# Patient Record
Sex: FEMALE
[De-identification: names, ages, dates, MRNs, and addresses within clinical notes are randomized per-mention and may not be internally consistent; named-entity substitution may affect disease eponyms.]

---

## 2019-03-26 ENCOUNTER — APPOINTMENT (OUTPATIENT)
Dept: OTOLARYNGOLOGY | Facility: CLINIC | Age: 39
End: 2019-03-26
Payer: COMMERCIAL

## 2019-03-26 VITALS
HEIGHT: 66 IN | OXYGEN SATURATION: 98 % | SYSTOLIC BLOOD PRESSURE: 133 MMHG | HEART RATE: 76 BPM | DIASTOLIC BLOOD PRESSURE: 84 MMHG | BODY MASS INDEX: 26.84 KG/M2 | WEIGHT: 167 LBS

## 2019-03-26 DIAGNOSIS — Z86.018 PERSONAL HISTORY OF OTHER BENIGN NEOPLASM: ICD-10-CM

## 2019-03-26 DIAGNOSIS — Z78.9 OTHER SPECIFIED HEALTH STATUS: ICD-10-CM

## 2019-03-26 DIAGNOSIS — Z83.79 FAMILY HISTORY OF OTHER DISEASES OF THE DIGESTIVE SYSTEM: ICD-10-CM

## 2019-03-26 DIAGNOSIS — Z87.09 PERSONAL HISTORY OF OTHER DISEASES OF THE RESPIRATORY SYSTEM: ICD-10-CM

## 2019-03-26 DIAGNOSIS — H61.23 IMPACTED CERUMEN, BILATERAL: ICD-10-CM

## 2019-03-26 DIAGNOSIS — H60.312 DIFFUSE OTITIS EXTERNA, LEFT EAR: ICD-10-CM

## 2019-03-26 PROBLEM — Z00.00 ENCOUNTER FOR PREVENTIVE HEALTH EXAMINATION: Status: ACTIVE | Noted: 2019-03-26

## 2019-03-26 PROCEDURE — 99213 OFFICE O/P EST LOW 20 MIN: CPT | Mod: 25

## 2019-03-26 PROCEDURE — 69210 REMOVE IMPACTED EAR WAX UNI: CPT

## 2019-03-26 RX ORDER — THYROID,PORK 81.25 MG
TABLET ORAL
Refills: 0 | Status: ACTIVE | COMMUNITY

## 2019-03-26 RX ORDER — HYDROCORTISONE AND ACETIC ACID OTIC 20.75; 10.375 MG/ML; MG/ML
1-2 SOLUTION AURICULAR (OTIC) TWICE DAILY
Qty: 1 | Refills: 3 | Status: ACTIVE | COMMUNITY
Start: 2019-03-26 | End: 1900-01-01

## 2019-03-26 NOTE — HISTORY OF PRESENT ILLNESS
[de-identified] : Susana Harrison Is here for left ear fullness, hearing loss, and discomfort. She thinks she may have cerumen impaction. She had a recent upper respiratory tract infection which has been improving. She is otherwise doing well.

## 2024-03-29 ENCOUNTER — NON-APPOINTMENT (OUTPATIENT)
Age: 44
End: 2024-03-29

## 2024-04-18 ENCOUNTER — TRANSCRIPTION ENCOUNTER (OUTPATIENT)
Age: 44
End: 2024-04-18

## 2024-04-18 ENCOUNTER — APPOINTMENT (OUTPATIENT)
Dept: OTOLARYNGOLOGY | Facility: CLINIC | Age: 44
End: 2024-04-18
Payer: COMMERCIAL

## 2024-04-18 VITALS — WEIGHT: 165 LBS | BODY MASS INDEX: 23.62 KG/M2 | HEIGHT: 70 IN

## 2024-04-18 DIAGNOSIS — Z86.39 PERSONAL HISTORY OF OTHER ENDOCRINE, NUTRITIONAL AND METABOLIC DISEASE: ICD-10-CM

## 2024-04-18 PROCEDURE — 99203 OFFICE O/P NEW LOW 30 MIN: CPT

## 2024-04-18 RX ORDER — LEVOTHYROXINE SODIUM 0.11 MG/1
112 TABLET ORAL
Refills: 0 | Status: ACTIVE | COMMUNITY

## 2024-04-18 RX ORDER — CETIRIZINE HYDROCHLORIDE 10 MG/1
TABLET, FILM COATED ORAL
Refills: 0 | Status: ACTIVE | COMMUNITY

## 2024-04-18 RX ORDER — SPIRONOLACTONE 100 MG/1
100 TABLET ORAL
Refills: 0 | Status: ACTIVE | COMMUNITY

## 2024-04-18 NOTE — PHYSICAL EXAM
[TextEntry] : General: The patient was alert, oriented and in no distress. Voice was clear.  Face: The patient had no facial asymmetry or mass. The skin was unremarkable.  Ears: Hearing normal to conversational voice External ears were normal without deformity.  PROCEDURE- EAR MICROSCOPY AND CERUMEN REMOVAL  Indication: cerumen removal Under the microscope, scant cerumen andn dry skin were removed from the both ears with a suction, curette and/or forceps. Canals: no inflammation. Tympanic membranes: Intact. no perforation or effusion.  Nose: The external nose had no significant deformity.  On anterior rhinoscopy, the nasal mucosa was normal. The anterior septum was mildly deviated to the right. There were no visualized polyps, purulence or masses.  Oral cavity: Oral mucosa- normal. Oral and base of tongue- clear and without mass. Gingival and buccal mucosa- moist and without lesions. Palate- the palate moved well. There was no cleft palate. There appeared to be good salivary flow. Oral cavity/oropharynx- no pus or erythema.  There was a 4 to 5 mm benign-appearing fibroma on the inner surface of the right lip near the oral commissure.  There was a 2 to 3 mm similar lesion on the inner surface of the lower lip on the left side near the oral commissure.     Neck: The neck was symmetrical. The parotid and submandibular glands were normal without masses. The trachea was midline and there was no unusual crepitus. Thyroid was smooth and nontender and no masses were palpated. No masses  Lymphatics: Cervical adenopathy- none.

## 2024-04-18 NOTE — ASSESSMENT
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  She has no pain.  The right one has increased in size.  Plan -Findings and management options were discussed with the patient.  She wishes to have the lesions removed.  I discussed the risk/benefits.  The risk of the procedure include reaction to anesthetic, bleeding, infection, scarring or cosmetic deformity, recurrence, need for further surgery, and rarely, numbness or weakness of the lip.  She does wish to proceed.  I will have her speak with my surgery scheduler about arranging this in the office.

## 2024-04-18 NOTE — HISTORY OF PRESENT ILLNESS
[de-identified] : VIVIAN GUZMÁN is a 44 year old patient Here to discuss excision of 2 lower lip lesions.  She has had them for many years.  They were noted on exam in 2019.  She said that the right one has enlarged since then.  She does keep biting it when chewing.  The smaller left 1 has remained unchanged.  She wishes to have them removed.  She is otherwise doing well.

## 2024-04-19 ENCOUNTER — APPOINTMENT (OUTPATIENT)
Dept: OTOLARYNGOLOGY | Facility: CLINIC | Age: 44
End: 2024-04-19
Payer: COMMERCIAL

## 2024-04-19 PROCEDURE — 40812 EXCISE/REPAIR MOUTH LESION: CPT

## 2024-04-19 RX ORDER — AMOXICILLIN 875 MG/1
875 TABLET, FILM COATED ORAL
Qty: 10 | Refills: 0 | Status: ACTIVE | COMMUNITY
Start: 2024-04-19 | End: 1900-01-01

## 2024-04-26 ENCOUNTER — APPOINTMENT (OUTPATIENT)
Dept: OTOLARYNGOLOGY | Facility: CLINIC | Age: 44
End: 2024-04-26
Payer: COMMERCIAL

## 2024-04-26 DIAGNOSIS — K13.0 DISEASES OF LIPS: ICD-10-CM

## 2024-04-26 LAB — CORE LAB BIOPSY: NORMAL

## 2024-04-26 PROCEDURE — 99024 POSTOP FOLLOW-UP VISIT: CPT

## 2024-04-26 NOTE — ASSESSMENT
[FreeTextEntry1] : She is doing well following excision of the 2 lower lip lesions.  Pathology was benign.  The incision sites are healing well  Plan She is going to call me and return if she has any recurrent lesions or concerns.  Otherwise I will see her back as needed

## 2024-04-26 NOTE — HISTORY OF PRESENT ILLNESS
[Home] : at home, [unfilled] , at the time of the visit. [Medical Office: ([facility])___] : at the medical office located in  [de-identified] : VIVIAN GUZMÁN is a 44 year old patient Who underwent excision of bilateral lower lip mucosal lesions.  She has been doing well.  The area is healing nicely.  There is a little bump on the right side which may be due to the healing incision.  Pathology was benign and consistent with fibrosis.

## 2024-04-26 NOTE — PHYSICAL EXAM
[TextEntry] : PHYSICAL EXAM- Limited exam as it was a telemedicine visit done via video  General: The patient was alert, oriented and in no distress. Voice was clear.  Face: The patient had no facial asymmetry or mass. The skin was unremarkable.  Ears: Hearing normal to conversational voice External ears were normal without deformity.  Nose:  The external nose had no significant deformity.  No obvious nasal drainage.   Oral cavity: The incision sites were healing well.  There was no obvious recurrent lesion.  There was no erythema or signs of infection.  Neck:  The neck appeared symmetrical. no obvious bulging masses

## 2025-03-31 NOTE — ASSESSMENT
[FreeTextEntry1] : Cerumen was removed from the ears. She has mild left otitis externa.\par \par she has two small lower lip fibromas \par \par Plan\par -Findings and management options were discussed with the patient.\par -Good aural hygiene reviewed. The patient was told to avoid cleaning the ears with cotton swabs.\par -dry ear precautions\par -the patient will be placed on Vosol HC ear drops for one week.\par -noise precautions\par -I asked her to consider a hearing test if she does not feel she is hearing well. she felt her hearing improved after the wax was removed. \par -follow up in one week to check the left ear if needed. If she is doing well, she will followup in one year as needed to check her ears. She will also contact me if she would like to have the small fibromas removed from her lip. \par -call and return earlier if any concerns PROCEDURES:  Repair, hernia, ventral, laparoscopic, using component separation technique 31-Mar-2025 08:05:25  Pineda Castañeda